# Patient Record
(demographics unavailable — no encounter records)

---

## 2024-10-11 NOTE — HISTORY OF PRESENT ILLNESS
[Parents] : parents [Cow's milk (Ounces per day ___)] : consumes [unfilled] oz of cow's milk per day [Wakes up at night] : Wakes up at night [Brushing teeth] : Brushing teeth [Car seat in back seat] : Car seat in back seat [Carbon Monoxide Detectors] : Carbon monoxide detectors [de-identified] : chicken, cheese, pasta, no fruit. eats vegetables when hidden. cracker  [FreeTextEntry8] : Soft/marble like BM - Yellow...Never blood in stool. Normal urination.  [FreeTextEntry9] : HOME [FreeTextEntry1] :  Raised bump on Back of Right thigh - comes and goes. not itchy. not bothersome. parents think it has been there since birth

## 2024-10-11 NOTE — DEVELOPMENTAL MILESTONES
[Speaks in sounds that seem like] : speaks in sounds that seem like an unknown language [Begins to run] : begins to run [Yes: _______] : yes, [unfilled] [Imitates scribbling] : does not imitate scribbling [Points to ask for something] : does not point to ask for something or to get help [Follows directions that do not] : does not follow direction that do not include a gesture [Looks when parent says,] : does not look when parent says, "Where is...?" [FreeTextEntry1] : Responding to name Sometimes distracted by commands.  Duck, bye, yeah, baby does not use mama, al specifically.  Grunts/cry when he wants things.  Flapping hands and clapping when he gets excited.  Does not imitate + Object Permanence.  often will throw toys

## 2024-10-11 NOTE — PHYSICAL EXAM
[Alert] : alert [No Acute Distress] : no acute distress [Normocephalic] : normocephalic [Anterior [place] Closed] : anterior fontanelle closed [Red Reflex Bilateral] : red reflex bilateral [PERRL] : PERRL [Normally Placed Ears] : normally placed ears [Auricles Well Formed] : auricles well formed [Clear Tympanic membranes with present light reflex and bony landmarks] : clear tympanic membranes with present light reflex and bony landmarks [No Discharge] : no discharge [Nares Patent] : nares patent [Palate Intact] : palate intact [Uvula Midline] : uvula midline [Tooth Eruption] : tooth eruption  [Supple, full passive range of motion] : supple, full passive range of motion [No Palpable Masses] : no palpable masses [Symmetric Chest Rise] : symmetric chest rise [Clear to Auscultation Bilaterally] : clear to auscultation bilaterally [Regular Rate and Rhythm] : regular rate and rhythm [S1, S2 present] : S1, S2 present [No Murmurs] : no murmurs [+2 Femoral Pulses] : +2 femoral pulses [Soft] : soft [NonTender] : non tender [Non Distended] : non distended [Normoactive Bowel Sounds] : normoactive bowel sounds [No Hepatomegaly] : no hepatomegaly [No Splenomegaly] : no splenomegaly [Central Urethral Opening] : central urethral opening [Testicles Descended Bilaterally] : testicles descended bilaterally [Patent] : patent [Normally Placed] : normally placed [No Abnormal Lymph Nodes Palpated] : no abnormal lymph nodes palpated [No Clavicular Crepitus] : no clavicular crepitus [Negative Bolivar-Ortalani] : negative Bolivar-Ortalani [Symmetric Buttocks Creases] : symmetric buttocks creases [No Spinal Dimple] : no spinal dimple [NoTuft of Hair] : no tuft of hair [Cranial Nerves Grossly Intact] : cranial nerves grossly intact [No Rash or Lesions] : no rash or lesions

## 2024-10-11 NOTE — PHYSICAL EXAM
[Alert] : alert [No Acute Distress] : no acute distress [Normocephalic] : normocephalic [Anterior [place] Closed] : anterior fontanelle closed [Red Reflex Bilateral] : red reflex bilateral [PERRL] : PERRL [Normally Placed Ears] : normally placed ears [Auricles Well Formed] : auricles well formed [Clear Tympanic membranes with present light reflex and bony landmarks] : clear tympanic membranes with present light reflex and bony landmarks [No Discharge] : no discharge [Nares Patent] : nares patent [Palate Intact] : palate intact [Uvula Midline] : uvula midline [Tooth Eruption] : tooth eruption  [Supple, full passive range of motion] : supple, full passive range of motion [No Palpable Masses] : no palpable masses [Symmetric Chest Rise] : symmetric chest rise [Clear to Auscultation Bilaterally] : clear to auscultation bilaterally [Regular Rate and Rhythm] : regular rate and rhythm [S1, S2 present] : S1, S2 present [No Murmurs] : no murmurs [+2 Femoral Pulses] : +2 femoral pulses [Soft] : soft [NonTender] : non tender [Non Distended] : non distended [Normoactive Bowel Sounds] : normoactive bowel sounds [No Hepatomegaly] : no hepatomegaly [No Splenomegaly] : no splenomegaly [Central Urethral Opening] : central urethral opening [Testicles Descended Bilaterally] : testicles descended bilaterally [Patent] : patent [Normally Placed] : normally placed [No Abnormal Lymph Nodes Palpated] : no abnormal lymph nodes palpated [No Clavicular Crepitus] : no clavicular crepitus [Negative Bolivar-Ortalani] : negative Bolivar-Ortalani [Symmetric Buttocks Creases] : symmetric buttocks creases [No Spinal Dimple] : no spinal dimple [NoTuft of Hair] : no tuft of hair [Cranial Nerves Grossly Intact] : cranial nerves grossly intact [No Rash or Lesions] : no rash or lesions

## 2024-10-11 NOTE — HISTORY OF PRESENT ILLNESS
[Parents] : parents [Cow's milk (Ounces per day ___)] : consumes [unfilled] oz of cow's milk per day [Wakes up at night] : Wakes up at night [Brushing teeth] : Brushing teeth [Car seat in back seat] : Car seat in back seat [Carbon Monoxide Detectors] : Carbon monoxide detectors [de-identified] : chicken, cheese, pasta, no fruit. eats vegetables when hidden. cracker  [FreeTextEntry8] : Soft/marble like BM - Yellow...Never blood in stool. Normal urination.  [FreeTextEntry9] : HOME [FreeTextEntry1] :  Raised bump on Back of Right thigh - comes and goes. not itchy. not bothersome. parents think it has been there since birth

## 2024-10-11 NOTE — DISCUSSION/SUMMARY
[FreeTextEntry1] :  Developmental Concern - Close monitoring, MCHAT and SWYC at next visit - If continued concerns would refer to EI  Delayed immunization - MMR and Varicella today - Counseled on Flu. - Due for PCV and Hib  [facility] - Appropriate growth  - Continue whole cow's milk. Continue table foods, 3 meals with 2-3 snacks per day. - Incorporate water daily in a sippy cup. Brush teeth twice a day with soft toothbrush. - When in car, keep child in rear-facing car seats until age 2, or until  the maximum height and weight for seat is reached. - Put baby to sleep in own crib. Help baby to maintain consistent daily routines and sleep schedule. - Ensure home is safe since baby is increasingly mobile. - Read aloud to baby. - Return for 18 month old [facility]

## 2024-10-11 NOTE — DISCUSSION/SUMMARY
[FreeTextEntry1] :  Developmental Concern - Close monitoring, MCHAT and SWYC at next visit - If continued concerns would refer to EI  Delayed immunization - MMR and Varicella today - Counseled on Flu. - Due for PCV and Hib  [facility] - Appropriate growth  - Continue whole cow's milk. Continue table foods, 3 meals with 2-3 snacks per day. - Incorporate water daily in a sippy cup. Brush teeth twice a day with soft toothbrush. - When in car, keep child in rear-facing car seats until age 2, or until  the maximum height and weight for seat is reached. - Put baby to sleep in own crib. Help baby to maintain consistent daily routines and sleep schedule. - Ensure home is safe since baby is increasingly mobile. - Read aloud to baby. - Return for 18 month old [facility]

## 2024-10-22 NOTE — PHYSICAL EXAM
[Exposed Vessel] : left anterior vessel not exposed [Clear to Auscultation] : lungs were clear to auscultation bilaterally [Wheezing] : no wheezing [Increased Work of Breathing] : no increased work of breathing with use of accessory muscles and retractions [Normal Gait and Station] : normal gait and station [Normal muscle strength, symmetry and tone of facial, head and neck musculature] : normal muscle strength, symmetry and tone of facial, head and neck musculature [Normal] : no cervical lymphadenopathy [de-identified] : ankyloglossia

## 2024-10-22 NOTE — HISTORY OF PRESENT ILLNESS
[No Personal or Family History of Easy Bruising, Bleeding, or Issues with General Anesthesia] : No Personal or Family History of easy bruising, bleeding, or issues with general anesthesia [de-identified] : 17 month old by presents for initial evaluation of possible tongue tie.  Denies otalgia, otorrhea, recent fevers or ear infections.  No parental concerns with hearing or speech. Has a few words in his vocabulary.   Denies nasal congestion or snoring.  No throat/tonsil infections.  Eating/drinking without difficulty, just a picky eater.

## 2024-10-22 NOTE — BIRTH HISTORY
[At Term] : at term [Normal Vaginal Route] : by normal vaginal route [None] : No maternal complications [Passed] : passed [de-identified] : No NICU stay

## 2024-10-22 NOTE — REASON FOR VISIT
[Initial Evaluation] : an initial evaluation for [Parents] : parents [FreeTextEntry2] : for possible tongue tie

## 2025-05-12 NOTE — HISTORY OF PRESENT ILLNESS
[Parents] : parents [Cow's milk (Ounces per day ___)] : consumes [unfilled] oz of Cow's milk per day [Fruit] : fruit [Vegetables] : vegetables [Meat] : meat [Eggs] : eggs [Baby food] : baby food [Finger Foods] : finger foods [Table food] : table food [___ stools per day] : [unfilled]  stools per day [Normal] : Normal [Wakes up at night] : Wakes up at night [Brushing teeth] : Brushing teeth [Toothpaste] : Primary Fluoride Source: Toothpaste [<2 hrs of screen time] : Less than 2 hrs of screen time [No] : No cigarette smoke exposure [Car seat in back seat] : Car seat in back seat [Smoke Detectors] : Smoke detectors [Carbon Monoxide Detectors] : Carbon monoxide detectors [Up to date] : Up to date [NO] : No [Exposure to electronic nicotine delivery system] : No exposure to electronic nicotine delivery system [de-identified] : A little picky [FreeTextEntry9] : HOME

## 2025-05-12 NOTE — DEVELOPMENTAL MILESTONES
[Plays alongside other children] : plays alongside other children [Takes off some clothing] : takes off some clothing [Scoops well with spoon] : scoops well with spoon [Kicks ball] : kicks ball  [Runs with coordination] : runs with coordination [Climbs up a ladder at a] : climbs up a ladder at a playground [Stacks objects] : stacks objects [Turns book pages] : turns book pages [Uses hands to turn objects] : uses hands to turn objects [Yes: _______] : yes, [unfilled] [Uses 50 words] : does not use 50 words [Combine 2 words into phrase or] : does not combine 2 words into phrase or sentences [Follows 2-step command] : does not follow 2-step command [Uses words that are 50% intelligible] : does not use words that are 50% intelligible to strangers [Jumps off ground with 2 feet] : does not jump off ground with 2 feet [FreeTextEntry1] : Says "mama, al, duck, moo, mewolf, no".

## 2025-05-12 NOTE — PHYSICAL EXAM
[No Acute Distress] : no acute distress [Normocephalic] : normocephalic [Anterior [place] Closed] : anterior fontanelle closed [Red Reflex Bilateral] : red reflex bilateral [PERRL] : PERRL [Normally Placed Ears] : normally placed ears [Auricles Well Formed] : auricles well formed [Clear Tympanic membranes with present light reflex and bony landmarks] : clear tympanic membranes with present light reflex and bony landmarks [No Discharge] : no discharge [Nares Patent] : nares patent [Palate Intact] : palate intact [Uvula Midline] : uvula midline [Tooth Eruption] : tooth eruption  [Supple, full passive range of motion] : supple, full passive range of motion [No Palpable Masses] : no palpable masses [Symmetric Chest Rise] : symmetric chest rise [Clear to Auscultation Bilaterally] : clear to auscultation bilaterally [Regular Rate and Rhythm] : regular rate and rhythm [S1, S2 present] : S1, S2 present [No Murmurs] : no murmurs [+2 Femoral Pulses] : +2 femoral pulses [Soft] : soft [NonTender] : non tender [Non Distended] : non distended [Normoactive Bowel Sounds] : normoactive bowel sounds [No Hepatomegaly] : no hepatomegaly [No Splenomegaly] : no splenomegaly [Central Urethral Opening] : central urethral opening [Testicles Descended Bilaterally] : testicles descended bilaterally [Patent] : patent [Normally Placed] : normally placed [No Abnormal Lymph Nodes Palpated] : no abnormal lymph nodes palpated [No Clavicular Crepitus] : no clavicular crepitus [Symmetric Buttocks Creases] : symmetric buttocks creases [No Spinal Dimple] : no spinal dimple [NoTuft of Hair] : no tuft of hair [Cranial Nerves Grossly Intact] : cranial nerves grossly intact [No Rash or Lesions] : no rash or lesions [FreeTextEntry1] : Asleep during exam

## 2025-05-12 NOTE — HISTORY OF PRESENT ILLNESS
[Parents] : parents [Cow's milk (Ounces per day ___)] : consumes [unfilled] oz of Cow's milk per day [Fruit] : fruit [Vegetables] : vegetables [Meat] : meat [Eggs] : eggs [Baby food] : baby food [Finger Foods] : finger foods [Table food] : table food [___ stools per day] : [unfilled]  stools per day [Normal] : Normal [Wakes up at night] : Wakes up at night [Brushing teeth] : Brushing teeth [Toothpaste] : Primary Fluoride Source: Toothpaste [<2 hrs of screen time] : Less than 2 hrs of screen time [No] : No cigarette smoke exposure [Car seat in back seat] : Car seat in back seat [Smoke Detectors] : Smoke detectors [Carbon Monoxide Detectors] : Carbon monoxide detectors [Up to date] : Up to date [NO] : No [Exposure to electronic nicotine delivery system] : No exposure to electronic nicotine delivery system [de-identified] : A little picky [FreeTextEntry9] : HOME

## 2025-05-12 NOTE — DISCUSSION/SUMMARY
[Assessment of Language Development] : assessment of language development [Temperament and Behavior] : temperament and behavior [Toilet Training] : toilet training [TV Viewing] : tv viewing [Safety] : safety [Mother] : mother [Father] : father [FreeTextEntry1] :  24 month old M presenting for a WCC. Appropriate growth and motor development. Hgb wnl and lead negative. Amblyopia screen no risk factors. MCHAT low risk. Speech delay.   - Continue cow's milk - Continue table foods, 3 meals with 2-3 snacks per day -  Incorporate fluorinated water daily in a sippy cup - Brush teeth twice a day with soft toothbrush. Recommend visit to dentist - When in car, keep child in rear-facing car seats until age 2, or until  the maximum height and weight for seat is reached - Put toddler to sleep in own bed. Help toddler to maintain consistent daily routines and sleep schedule - Toilet training discussed - Ensure home is safe - Use consistent, positive discipline - Read aloud to toddler - Limit screen time to no more than 2 hours per day.   - Return in 6 months for 2.5 WCC. - Declined immunizations today (Due for Prevnar, Hib, DTaP, and Hep A). Child fell asleep. Will return for vaccine-only visit.

## 2025-05-12 NOTE — PHYSICAL EXAM
[No Acute Distress] : no acute distress [Normocephalic] : normocephalic [Anterior [place] Closed] : anterior fontanelle closed [Red Reflex Bilateral] : red reflex bilateral [PERRL] : PERRL [Normally Placed Ears] : normally placed ears [Auricles Well Formed] : auricles well formed [Clear Tympanic membranes with present light reflex and bony landmarks] : clear tympanic membranes with present light reflex and bony landmarks [No Discharge] : no discharge [Nares Patent] : nares patent [Palate Intact] : palate intact [Uvula Midline] : uvula midline [Tooth Eruption] : tooth eruption  [Supple, full passive range of motion] : supple, full passive range of motion [No Palpable Masses] : no palpable masses [Symmetric Chest Rise] : symmetric chest rise [Clear to Auscultation Bilaterally] : clear to auscultation bilaterally [Regular Rate and Rhythm] : regular rate and rhythm [S1, S2 present] : S1, S2 present [No Murmurs] : no murmurs [+2 Femoral Pulses] : +2 femoral pulses [Soft] : soft [NonTender] : non tender [Non Distended] : non distended [Normoactive Bowel Sounds] : normoactive bowel sounds [No Hepatomegaly] : no hepatomegaly [No Splenomegaly] : no splenomegaly [Central Urethral Opening] : central urethral opening [Testicles Descended Bilaterally] : testicles descended bilaterally [Patent] : patent [Normally Placed] : normally placed [No Abnormal Lymph Nodes Palpated] : no abnormal lymph nodes palpated [No Clavicular Crepitus] : no clavicular crepitus [Symmetric Buttocks Creases] : symmetric buttocks creases [No Spinal Dimple] : no spinal dimple [NoTuft of Hair] : no tuft of hair [Cranial Nerves Grossly Intact] : cranial nerves grossly intact [No Rash or Lesions] : no rash or lesions [FreeTextEntry1] : Asleep during exam

## 2025-05-14 NOTE — HISTORY OF PRESENT ILLNESS
[de-identified] : FEVER AND COUGHING [FreeTextEntry6] : 1 y/o M complaining of fever and cough x1.5 days. Tmax of 104 F. Notes 1 episode of NBNB vomiting. Tolerating fluids with decreased appetite. Making wet diapers but less than usual. Denies any SOB. Also notes non-pruritic rash.

## 2025-05-14 NOTE — PHYSICAL EXAM
[NL] : moves all extremities x4, warm, well perfused x4 [Erythematous Oropharynx] : erythematous oropharynx [de-identified] : diffuse macular rash

## 2025-05-14 NOTE — DISCUSSION/SUMMARY
[FreeTextEntry1] : 1 y/o M present with fever, cough and diffuse macular rash. Erythematous oropharynx noted on exam, will evaluate for strep throat.  Plan: 1. Rapid strep POSITIVE 2. Amoxicillin as prescribed 3. Supportive care with Tylenol/Motrin PRN, increased fluids, keeping head elevated and rest  4. Monitor and return with any new or worsening symptoms.